# Patient Record
Sex: FEMALE | Race: ASIAN | Employment: UNEMPLOYED | URBAN - METROPOLITAN AREA
[De-identification: names, ages, dates, MRNs, and addresses within clinical notes are randomized per-mention and may not be internally consistent; named-entity substitution may affect disease eponyms.]

---

## 2017-04-18 DIAGNOSIS — Z80.3 FH: BREAST CANCER IN FIRST DEGREE RELATIVE: ICD-10-CM

## 2017-04-18 DIAGNOSIS — R92.0 MICROCALCIFICATIONS OF THE BREAST: Primary | ICD-10-CM

## 2017-05-01 ENCOUNTER — HOSPITAL ENCOUNTER (OUTPATIENT)
Dept: MAMMOGRAPHY | Facility: HOSPITAL | Age: 62
Discharge: HOME | End: 2017-05-01
Attending: COLON & RECTAL SURGERY
Payer: COMMERCIAL

## 2017-05-01 DIAGNOSIS — Z80.3 FH: BREAST CANCER IN FIRST DEGREE RELATIVE: ICD-10-CM

## 2017-05-01 DIAGNOSIS — R92.0 MICROCALCIFICATIONS OF THE BREAST: ICD-10-CM

## 2017-05-01 PROCEDURE — 77062 BREAST TOMOSYNTHESIS BI: CPT

## 2017-05-01 PROCEDURE — 77066 DX MAMMO INCL CAD BI: CPT

## 2017-05-22 ENCOUNTER — OFFICE VISIT (OUTPATIENT)
Dept: SURGERY | Facility: CLINIC | Age: 62
End: 2017-05-22

## 2017-05-22 VITALS
RESPIRATION RATE: 18 BRPM | SYSTOLIC BLOOD PRESSURE: 122 MMHG | HEIGHT: 60 IN | HEART RATE: 64 BPM | WEIGHT: 96 LBS | BODY MASS INDEX: 18.85 KG/M2 | DIASTOLIC BLOOD PRESSURE: 85 MMHG

## 2017-05-22 DIAGNOSIS — N63.20 LEFT BREAST MASS: Primary | ICD-10-CM

## 2017-05-22 DIAGNOSIS — Z80.3 FAMILY HISTORY OF BREAST CANCER IN SISTER: ICD-10-CM

## 2017-05-22 DIAGNOSIS — N60.19 FIBROCYSTIC MASTOPATHY, UNSPECIFIED LATERALITY: ICD-10-CM

## 2017-05-22 PROCEDURE — 99244 OFF/OP CNSLTJ NEW/EST MOD 40: CPT | Performed by: COLON & RECTAL SURGERY

## 2017-05-22 NOTE — PATIENT INSTRUCTIONS
This patient presents for evaluation of the breasts bilaterally after mammogram was performed on May 1, 2017. The mammogram and ultrasound were obtained at [facility] in our a combined BI-RADS 2 finding.   Was performed at [facility] and is a BI- no palpable anterior neck nodes, and no palpable supraclavicular lymph nodes. This patient will require evaluation of this left breast nodule. It was not seen on mammography.     I am ordering an ultrasound-guided left breast biopsy secondary to the dif

## 2017-05-23 ENCOUNTER — TELEPHONE (OUTPATIENT)
Dept: SURGERY | Facility: CLINIC | Age: 62
End: 2017-05-23

## 2017-05-23 DIAGNOSIS — N63.0 BREAST NODULE: Primary | ICD-10-CM

## 2017-05-24 ENCOUNTER — HOSPITAL ENCOUNTER (OUTPATIENT)
Dept: MAMMOGRAPHY | Facility: HOSPITAL | Age: 62
Discharge: HOME | End: 2017-05-24
Attending: COLON & RECTAL SURGERY
Payer: COMMERCIAL

## 2017-05-24 DIAGNOSIS — N63.0 BREAST NODULE: ICD-10-CM

## 2017-05-24 PROCEDURE — 77065 DX MAMMO INCL CAD UNI: CPT | Performed by: COLON & RECTAL SURGERY

## 2017-05-24 PROCEDURE — 88305 TISSUE EXAM BY PATHOLOGIST: CPT | Performed by: COLON & RECTAL SURGERY

## 2017-05-24 PROCEDURE — 76642 ULTRASOUND BREAST LIMITED: CPT | Performed by: COLON & RECTAL SURGERY

## 2017-05-24 PROCEDURE — 19083 BX BREAST 1ST LESION US IMAG: CPT | Performed by: COLON & RECTAL SURGERY

## 2017-05-24 NOTE — IMAGING NOTE
Procedure explained throughout and all questions answered. Consent and discharge paperwork signed by Daniel Zaragoza. Left breast positioned. Assisted Dr. Charles Garsia with US guided biopsy.  Pt tolerated well. Pressure held on biopsy site for 10 min.  Ve

## 2017-06-01 ENCOUNTER — OFFICE VISIT (OUTPATIENT)
Dept: SURGERY | Facility: CLINIC | Age: 62
End: 2017-06-01

## 2017-06-01 VITALS
RESPIRATION RATE: 18 BRPM | TEMPERATURE: 99 F | DIASTOLIC BLOOD PRESSURE: 91 MMHG | HEART RATE: 80 BPM | SYSTOLIC BLOOD PRESSURE: 149 MMHG

## 2017-06-01 DIAGNOSIS — Z80.3 FAMILY HISTORY OF BREAST CANCER IN SISTER: ICD-10-CM

## 2017-06-01 DIAGNOSIS — N63.20 LEFT BREAST MASS: Primary | ICD-10-CM

## 2017-06-01 PROCEDURE — 99213 OFFICE O/P EST LOW 20 MIN: CPT | Performed by: COLON & RECTAL SURGERY

## 2017-06-01 NOTE — PROGRESS NOTES
Follow Up Visit Note       Active Problems      1. Left breast mass    2. Family history of breast cancer in sister          Chief Complaint   Patient presents with:  Breast Problem: breast biopsy done 5/24, Pt in for results.          History of [facility]            Received:            05/24/2017 04:49 PM                                  Mammography                                                                     Pathologist:          Roberto Crystal MD reviewed by me during today. Review of Systems   Constitutional: Negative for fever, chills, diaphoresis, fatigue and unexpected weight change. HENT: Negative for hearing loss, nosebleeds, sore throat and trouble swallowing.     Respiratory: Negative for biopsy which was done by ultrasound-guided needle core sampling, by radiology, was negative for any evidence of malignancy or atypia. It consisted of predominantly fatty breast parenchyma. The patient's presenting history as listed below:     This patie perform the breast self-examination. No orders of the defined types were placed in this encounter. Imaging & Referrals   None    Follow Up  Return in about 7 months (around 1/1/2018).     Rossy Johnson MD

## 2017-06-02 NOTE — PATIENT INSTRUCTIONS
This patient presents for further care, treatment, and consultation regarding a left breast mass. At her last office visit, I identified a left breast mass at the 6 o'clock position at the edge of the breast capsule.   We immediately referred her for ult the patient identifies any findings on self-examination, she should return to my attention immediately, even if it's prior to her next scheduled appointment.   I have informed the patient that 10% of all breast cancers are not identified on mammogram.  The

## 2017-11-15 ENCOUNTER — TELEPHONE (OUTPATIENT)
Dept: SURGERY | Facility: CLINIC | Age: 62
End: 2017-11-15

## (undated) NOTE — MR AVS SNAPSHOT
[facility] [place] [facility]  [address] [address] [phone]               Thank you for choosing us for your health care visit with Nichole Monaco MD.  We are glad to serve you and happy to provide you with this summary of you someone to drive you home the day of the procedure. Plan on being in the hospital for approximately 2-3 hours from registration to the end of the procedure. Call [phone] for any other questions between the hours of 8:00AM-4:30PM Monday thru Friday. Clinical examination today reveals a small nodule at the 6 o'clock position of the left breast near the inferior mammary crease. The lesion is approximately 5 mm in size.   It is very difficult to palpate secondary to its involvement directly beneath the m HYZAAR 50-12.5 MG Tabs   Generic drug:  Losartan Potassium-HCTZ   Take 1 tablet by mouth daily.                    [employer]     Call the [employer] for assistance with your inactive [employer] account    If you have questions, you can call [phone] to talk

## (undated) NOTE — MR AVS SNAPSHOT
[facility] [place] [facility]  [address] [address] [phone]               Thank you for choosing us for your health care visit with Diana Littlejohn MD.  We are glad to serve you and happy to provide you with this summary of you followup examination and followup surveillance mammogram.        The biopsy was performed in 2005. Patient presents with a mammogram dated May 1, 2015. It is a BI-RADS 2 finding. One-year follow-up is recommended.   There are no suspicious microcal Calcium Carbonate 1250 (500 Ca) MG Tabs   Take 1 tablet by mouth daily. CENTRUM Tabs   Take 1 tablet by mouth daily. HYZAAR 50-12.5 MG Tabs   Generic drug:  Losartan Potassium-HCTZ   Take 1 tablet by mouth daily.                    [facility]ia

## (undated) NOTE — Clinical Note
2017    Patient: Benji Reese  : 3/23/1955 Visit date: 2017    Dear  Dr. Liv Thayer,    Thank you for referring Hannah Shetty to my practice. Please find my assessment and plan below.         Assessment   Left breast mass  (primar inversion or drainage. The left axilla shows no evidence of lymphadenopathy. The right breast exam is normal other than the symmetric fibrous mastopathy. The nipple is without inversion or drainage.   There are no mass lesions within the right breast p

## (undated) NOTE — Clinical Note
2017    Patient: Savage Cruz  : 3/23/1955 Visit date: 2017    Dear  Dr. Darline Tripathi,    Thank you for referring Savage Cruz to my practice. Please find my assessment and plan below.         Assessment   Left breast mass  (primary appears that there is a breast biopsy cavity palpable hematoma. No other lesions are identified. This correlates to the lesion that identified on clinical exam.  This is obviously the biopsy site performed by ultrasound.     We will see this patient again